# Patient Record
Sex: FEMALE | Race: WHITE | NOT HISPANIC OR LATINO | Employment: OTHER
[De-identification: names, ages, dates, MRNs, and addresses within clinical notes are randomized per-mention and may not be internally consistent; named-entity substitution may affect disease eponyms.]

---

## 2021-05-05 ENCOUNTER — TRANSCRIBE ORDERS (OUTPATIENT)
Dept: OTHER | Age: 71
End: 2021-05-05

## 2021-05-05 DIAGNOSIS — R15.9 FECAL INCONTINENCE: Primary | ICD-10-CM

## 2021-05-12 NOTE — TELEPHONE ENCOUNTER
RECORDS RECEIVED FROM: [facility], [place]    DATE RECEIVED:    NOTES STATUS DETAILS   OFFICE NOTE from referring provider  N/A    OFFICE NOTE from other specialist   Complete 5/3/2021 Office Visit with Family Provider in     She has been seen by general surgery physician, Dr. Friedman,who specializes in rectal surgery. He was unable to offer her any management and recommended referral to higher level care        DISCHARGE SUMMARY from hospital  N/A    DISCHARGE REPORT from the ER N/A    OPERATIVE REPORT  N/A    MEDICATION LIST Internal    LABS     PFC TESTING N/A    ANAL PAP N/A    BIOPSIES/PATHOLOGY RELATED TO DIAGNOSIS N/A    DIAGNOSTIC PROCEDURES     COLONOSCOPY N/A    UPPER ENDOSCOPY (EGD) N/A    FLEX SIGMOIDOSCOPY  N/A    ERCP N/A    IMAGING (DISC & REPORT)      CT  N/A    MRI N/A    XRAY N/A    ULTRASOUND (ENDOANAL/ENDORECTAL) N/A

## 2021-07-23 ENCOUNTER — PRE VISIT (OUTPATIENT)
Dept: SURGERY | Facility: CLINIC | Age: 71
End: 2021-07-23

## 2021-07-23 ENCOUNTER — OFFICE VISIT (OUTPATIENT)
Dept: SURGERY | Facility: CLINIC | Age: 71
End: 2021-07-23
Attending: NURSE PRACTITIONER
Payer: MEDICARE

## 2021-07-23 VITALS
HEART RATE: 66 BPM | WEIGHT: 148.7 LBS | SYSTOLIC BLOOD PRESSURE: 152 MMHG | BODY MASS INDEX: 23.34 KG/M2 | DIASTOLIC BLOOD PRESSURE: 69 MMHG | HEIGHT: 67 IN | OXYGEN SATURATION: 99 %

## 2021-07-23 DIAGNOSIS — R15.9 INCONTINENCE OF FECES, UNSPECIFIED FECAL INCONTINENCE TYPE: ICD-10-CM

## 2021-07-23 PROCEDURE — 99203 OFFICE O/P NEW LOW 30 MIN: CPT | Performed by: NURSE PRACTITIONER

## 2021-07-23 RX ORDER — INSULIN DEGLUDEC 100 U/ML
21-22 INJECTION, SOLUTION SUBCUTANEOUS
COMMUNITY
Start: 2021-05-18

## 2021-07-23 RX ORDER — PEN NEEDLE, DIABETIC 32 GX 1/4"
1 NEEDLE, DISPOSABLE MISCELLANEOUS
COMMUNITY
Start: 2021-05-18

## 2021-07-23 RX ORDER — IBUPROFEN 200 MG
200 TABLET ORAL
COMMUNITY

## 2021-07-23 RX ORDER — AMLODIPINE BESYLATE 10 MG/1
TABLET ORAL
COMMUNITY
Start: 2021-03-04

## 2021-07-23 RX ORDER — ACETAMINOPHEN 500 MG
1000 TABLET ORAL
COMMUNITY

## 2021-07-23 RX ORDER — METOPROLOL TARTRATE 25 MG/1
TABLET, FILM COATED ORAL
COMMUNITY
Start: 2021-02-18

## 2021-07-23 RX ORDER — LEVOTHYROXINE SODIUM 125 UG/1
TABLET ORAL
COMMUNITY
Start: 2021-05-18

## 2021-07-23 RX ORDER — PEN NEEDLE, DIABETIC 32GX 5/32"
NEEDLE, DISPOSABLE MISCELLANEOUS
COMMUNITY
Start: 2020-11-06

## 2021-07-23 ASSESSMENT — PAIN SCALES - GENERAL: PAINLEVEL: NO PAIN (0)

## 2021-07-23 ASSESSMENT — MIFFLIN-ST. JEOR: SCORE: 1222.13

## 2021-07-23 NOTE — PROGRESS NOTES
"Colon and Rectal Surgery Consult Clinic Note    Date: 2021     Referring provider:  Antonia Jensen NP  [facility]  [address] [place] [address]     RE: Becky aTtum  : 1950  KEAGAN: 2021    Becky Tatum is a very pleasant 70 year old female who presents today for fecal incontinence.    HPI:  Becky reports that she has had worsening difficulty with fecal incontinence over the past 10 years.  She had significant obstetric injury with 2 vaginal births with tearing into her rectum.  She reports of both were repaired.  She has never had any other anorectal surgeries.  In the past 10 years she has been getting worsening leakage of stool and urgency with bowel movements.  She has lost an entire bowel movement less than once a month but feels like she has to wipe multiple times to get clean.  She denies any fecal incontinence at night.  No urinary incontinence.  She only has very rare rectal bleeding.  No pain.  She reports that she has hemorrhoids that are visible but are not symptomatic.  She does not need to manually reduce them and they do not seem to get larger with bowel movements.  Stools are fairly soft and she recently started Citrucel capsules.  She has already noticed an improvement in symptoms with 2 capsules a day.  Her last colonoscopy was the either in  or  and she has never had any adenomatous polyps.  No family history of colon cancer.    Physical Examination:  BP (!) 152/69 (BP Location: Left arm, Patient Position: Sitting, Cuff Size: Adult Regular)   Pulse 66   Ht 5' 7\"   Wt 148 lb 11.2 oz   SpO2 99%   BMI 23.29 kg/m    General: Alert, oriented, in no acute distress, sitting comfortably  HEENT: mucous membranes moist    Perianal external examination: Exam was chaperoned by Andra Alvarez MA   Perianal skin: Intact with no excoriation or lichenification.  Lesions: No evidence of an external lesion, nodularity, or induration in " the perianal region.  Eversion of buttocks: There was not evidence of an anal fissure. Details: N/A.  Skin tags or external hemorrhoids: Yes: prolapsing internal hemorrhoids most prominently in the posterior position.  Digital rectal examination: Was performed.   Sphincter tone: fair resting and weak squeeze tone.  Palpable lesions: No.  Other: None.  Bimanual examination: was not performed    Anoscopy: Was deferred    Assessment/Plan: 70 year old female with history of obstetric injury and fecal incontinence. We had a long discussion about pelvic floor dysfunction and fecal incontinence. We spoke about conservative treatment (stool bulkening) versus sacral nerve stimulator and generally about outcomes associated with these. Will first try to bulken stools with increased fiber as she has already gotten some improvement from that. Will have the patient will see me if symptoms persists after 4-6 weeks. Would consider endoanal US given her obstetric injury and could discuss SNS. Asked her to follow up with her primary care team on timing of her last colonoscopy to see when she is due next as I do not have access to these records.    Medical history:  No past medical history on file.    Surgical history:  No past surgical history on file.    Problem list:  There are no problems to display for this patient.      Medications:  Current Outpatient Medications   Medication Sig Dispense Refill     acetaminophen (TYLENOL) 500 MG tablet Take 1,000 mg by mouth       amLODIPine (NORVASC) 10 MG tablet        calcium carbonate-vitamin D (OSCAL W/D) 500-200 MG-UNIT tablet Take 1 tablet by mouth 2 times daily       ibuprofen (ADVIL/MOTRIN) 200 MG tablet Take 200 mg by mouth       insulin degludec (TRESIBA FLEXTOUCH) 100 UNIT/ML pen Inject 21-22 Units Subcutaneous       insulin pen needle (FIFTY50 PEN NEEDLES) 32G X 6 MM miscellaneous 1 each by Other route       levothyroxine (SYNTHROID/LEVOTHROID) 125 MCG tablet Alternate every other  "day with 112 mcg dose       metFORMIN (GLUCOPHAGE) 1000 MG tablet Take 1,000 mg by mouth       metoprolol tartrate (LOPRESSOR) 25 MG tablet metoprolol tartrate 25 mg tablet       Multiple Vitamin (MULTI-VITAMIN DAILY PO) Take 1 tablet by mouth       SURE COMFORT PEN NEEDLES 32G X 6 MM miscellaneous          Allergies:  Allergies   Allergen Reactions     Lorazepam      Pentazocine      Prochlorperazine        Family history:  No family history on file.    Social history:  Social History     Tobacco Use     Smoking status: Never Smoker     Smokeless tobacco: Never Used   Substance Use Topics     Alcohol use: Not Currently    Marital status: single.  Occupation: RN in [employer] Notes:   Andra Alvarez  7/23/2021  8:37 AM  Signed  Chief Complaint   Patient presents with     Consult     fecal incontinence       Vitals:    07/23/21 0830   BP: (!) 152/69   BP Location: Left arm   Patient Position: Sitting   Cuff Size: Adult Regular   Pulse: 66   SpO2: 99%   Weight: 148 lb 11.2 oz   Height: 5' 7\"       Body mass index is 23.29 kg/m .    Andra Alvarez CMA         30 minutes spent on the date of the encounter doing chart review, history and exam, documentation and further activities as noted above.     DAVID Chang, NP-C  Colon and Rectal Surgery   [facility]    This note was created using speech recognition software and may contain unintended word substitutions.    "

## 2021-07-23 NOTE — NURSING NOTE
"Chief Complaint   Patient presents with     Consult     fecal incontinence       Vitals:    07/23/21 0830   BP: (!) 152/69   BP Location: Left arm   Patient Position: Sitting   Cuff Size: Adult Regular   Pulse: 66   SpO2: 99%   Weight: 148 lb 11.2 oz   Height: 5' 7\"       Body mass index is 23.29 kg/m .    Andra Alvarez CMA    "

## 2021-07-23 NOTE — PATIENT INSTRUCTIONS
1) Bulken stools with fiber. Slowly increase Citrucel capsules to about 4-6 a day. If you are still having leakage/incontinence, try switching to a fiber Citrucel 2-3 times a day.  2) We had a long discussion about pelvic floor dysfunction and fecal incontinence. We spoke about conservative treatment (stool bulkening) versus sacral nerve stimulator and generally about outcomes associated with these. Can reevaluate in about 4-6 weeks if still symptomatic  3) Check if you are due for a colonoscopy